# Patient Record
Sex: MALE | URBAN - METROPOLITAN AREA
[De-identification: names, ages, dates, MRNs, and addresses within clinical notes are randomized per-mention and may not be internally consistent; named-entity substitution may affect disease eponyms.]

---

## 2019-02-21 ENCOUNTER — HOSPITAL ENCOUNTER (OUTPATIENT)
Age: 59
Setting detail: SPECIMEN
Discharge: HOME | End: 2019-02-21
Payer: COMMERCIAL

## 2019-02-21 LAB
-: NORMAL
ALBUMIN SERPL-MCNC: 4.5 G/DL (ref 3.5–5.2)
ALBUMIN/GLOBULIN RATIO: 1.7 (ref 1–2.5)
ALP BLD-CCNC: 47 U/L (ref 40–129)
ALT SERPL-CCNC: 15 U/L (ref 5–41)
AMORPHOUS: NORMAL
AMYLASE: 75 U/L (ref 28–100)
ANION GAP SERPL CALCULATED.3IONS-SCNC: 13 MMOL/L (ref 9–17)
AST SERPL-CCNC: 17 U/L
BACTERIA: NORMAL
BILIRUB SERPL-MCNC: 0.7 MG/DL (ref 0.3–1.2)
BILIRUBIN URINE: NEGATIVE
BUN BLDV-MCNC: 11 MG/DL (ref 6–20)
BUN/CREAT BLD: ABNORMAL (ref 9–20)
CALCIUM SERPL-MCNC: 9.7 MG/DL (ref 8.6–10.4)
CASTS UA: NORMAL /LPF (ref 0–8)
CHLORIDE BLD-SCNC: 106 MMOL/L (ref 98–107)
CHOLESTEROL/HDL RATIO: 2.8
CHOLESTEROL: 131 MG/DL
CO2: 25 MMOL/L (ref 20–31)
COLOR: YELLOW
COMMENT UA: ABNORMAL
CREAT SERPL-MCNC: 0.92 MG/DL (ref 0.7–1.2)
CRYSTALS, UA: NORMAL /HPF
EPITHELIAL CELLS UA: NORMAL /HPF (ref 0–5)
ESTIMATED AVERAGE GLUCOSE: 111 MG/DL
GFR AFRICAN AMERICAN: >60 ML/MIN
GFR NON-AFRICAN AMERICAN: >60 ML/MIN
GFR SERPL CREATININE-BSD FRML MDRD: ABNORMAL ML/MIN/{1.73_M2}
GFR SERPL CREATININE-BSD FRML MDRD: ABNORMAL ML/MIN/{1.73_M2}
GLUCOSE BLD-MCNC: 108 MG/DL (ref 70–99)
GLUCOSE URINE: NEGATIVE
HBA1C MFR BLD: 5.5 % (ref 4–6)
HCT VFR BLD CALC: 51.5 % (ref 40.7–50.3)
HDLC SERPL-MCNC: 46 MG/DL
HEMOGLOBIN: 16.4 G/DL (ref 13–17)
KETONES, URINE: ABNORMAL
LDL CHOLESTEROL: 75 MG/DL (ref 0–130)
LEUKOCYTE ESTERASE, URINE: NEGATIVE
LIPASE: 54 U/L (ref 13–60)
MCH RBC QN AUTO: 26.9 PG (ref 25.2–33.5)
MCHC RBC AUTO-ENTMCNC: 31.8 G/DL (ref 28.4–34.8)
MCV RBC AUTO: 84.4 FL (ref 82.6–102.9)
MUCUS: NORMAL
NITRITE, URINE: NEGATIVE
NRBC AUTOMATED: 0 PER 100 WBC
OTHER OBSERVATIONS UA: NORMAL
PDW BLD-RTO: 12.9 % (ref 11.8–14.4)
PH UA: 5.5 (ref 5–8)
PLATELET # BLD: 268 K/UL (ref 138–453)
PMV BLD AUTO: 12.1 FL (ref 8.1–13.5)
POTASSIUM SERPL-SCNC: 4.4 MMOL/L (ref 3.7–5.3)
PROSTATE SPECIFIC ANTIGEN: 0.44 UG/L
PROTEIN UA: NEGATIVE
RBC # BLD: 6.1 M/UL (ref 4.21–5.77)
RBC UA: NORMAL /HPF (ref 0–4)
RENAL EPITHELIAL, UA: NORMAL /HPF
SEDIMENTATION RATE, ERYTHROCYTE: 0 MM (ref 0–10)
SODIUM BLD-SCNC: 144 MMOL/L (ref 135–144)
SPECIFIC GRAVITY UA: 1.02 (ref 1–1.03)
TOTAL PROTEIN: 7.2 G/DL (ref 6.4–8.3)
TRICHOMONAS: NORMAL
TRIGL SERPL-MCNC: 51 MG/DL
TSH SERPL DL<=0.05 MIU/L-ACNC: 1.78 MIU/L (ref 0.3–5)
TURBIDITY: ABNORMAL
URINE HGB: NEGATIVE
UROBILINOGEN, URINE: NORMAL
VLDLC SERPL CALC-MCNC: NORMAL MG/DL (ref 1–30)
WBC # BLD: 5.8 K/UL (ref 3.5–11.3)
WBC UA: NORMAL /HPF (ref 0–5)
YEAST: NORMAL

## 2019-02-22 LAB
CULTURE: NO GROWTH
Lab: NORMAL
SPECIMEN DESCRIPTION: NORMAL

## 2022-10-28 ENCOUNTER — APPOINTMENT (OUTPATIENT)
Dept: CT IMAGING | Age: 62
End: 2022-10-28
Payer: COMMERCIAL

## 2022-10-28 ENCOUNTER — APPOINTMENT (OUTPATIENT)
Dept: GENERAL RADIOLOGY | Age: 62
End: 2022-10-28
Payer: COMMERCIAL

## 2022-10-28 ENCOUNTER — HOSPITAL ENCOUNTER (EMERGENCY)
Age: 62
Discharge: NURSING, REHAB OR LONG-TERM CARE | End: 2022-10-29
Attending: EMERGENCY MEDICINE
Payer: COMMERCIAL
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
## 2022-10-28 DIAGNOSIS — F22 PARANOIA (HCC): ICD-10-CM

## 2022-10-28 DIAGNOSIS — F22 DELUSIONS (HCC): ICD-10-CM
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
## 2022-10-28 LAB
ABO/RH: NORMAL
ANION GAP SERPL CALCULATED.3IONS-SCNC: 22 MMOL/L (ref 9–17)
ANTIBODY SCREEN: NEGATIVE
ARM BAND NUMBER: NORMAL
BLOOD BANK SPECIMEN: ABNORMAL
BUN BLDV-MCNC: 14 MG/DL (ref 8–23)
CARBOXYHEMOGLOBIN: 1.2 % (ref 0–5)
CHLORIDE BLD-SCNC: 96 MMOL/L (ref 98–107)
CO2: 20 MMOL/L (ref 20–31)
CREAT SERPL-MCNC: 1.21 MG/DL (ref 0.7–1.2)
ETHANOL PERCENT: <0.01 %
ETHANOL: <10 MG/DL
EXPIRATION DATE: NORMAL
FIO2: ABNORMAL
GFR SERPL CREATININE-BSD FRML MDRD: 41 ML/MIN/1.73M2
GLUCOSE BLD-MCNC: 185 MG/DL (ref 70–99)
HCG QUALITATIVE: ABNORMAL
HCO3 VENOUS: 19.5 MMOL/L (ref 24–30)
HCT VFR BLD CALC: 51.8 % (ref 40.7–50.3)
HEMOGLOBIN: 17.3 G/DL (ref 13–17)
INR BLD: 1.1
MCH RBC QN AUTO: 27.4 PG (ref 25.2–33.5)
MCHC RBC AUTO-ENTMCNC: 33.4 G/DL (ref 28.4–34.8)
MCV RBC AUTO: 82.1 FL (ref 82.6–102.9)
NEGATIVE BASE EXCESS, VEN: 2.4 MMOL/L (ref 0–2)
NRBC AUTOMATED: 0 PER 100 WBC
O2 SAT, VEN: 92 % (ref 60–85)
PARTIAL THROMBOPLASTIN TIME: 21.5 SEC (ref 20.5–30.5)
PATIENT TEMP: 37
PCO2, VEN: 28.6 MM HG (ref 39–55)
PDW BLD-RTO: 13 % (ref 11.8–14.4)
PH VENOUS: 7.45 (ref 7.32–7.42)
PLATELET # BLD: 215 K/UL (ref 138–453)
PMV BLD AUTO: 12.5 FL (ref 8.1–13.5)
PO2, VEN: 57.2 MM HG (ref 30–50)
POTASSIUM SERPL-SCNC: 3.5 MMOL/L (ref 3.7–5.3)
PROTHROMBIN TIME: 11.2 SEC (ref 9.1–12.3)
RBC # BLD: 6.31 M/UL (ref 4.21–5.77)
SODIUM BLD-SCNC: 138 MMOL/L (ref 135–144)
WBC # BLD: 11.7 K/UL (ref 3.5–11.3)

## 2022-10-28 PROCEDURE — 96374 THER/PROPH/DIAG INJ IV PUSH: CPT

## 2022-10-28 PROCEDURE — 6360000002 HC RX W HCPCS: Performed by: STUDENT IN AN ORGANIZED HEALTH CARE EDUCATION/TRAINING PROGRAM

## 2022-10-28 PROCEDURE — 86900 BLOOD TYPING SEROLOGIC ABO: CPT

## 2022-10-28 PROCEDURE — 72125 CT NECK SPINE W/O DYE: CPT

## 2022-10-28 PROCEDURE — G0480 DRUG TEST DEF 1-7 CLASSES: HCPCS

## 2022-10-28 PROCEDURE — 85730 THROMBOPLASTIN TIME PARTIAL: CPT

## 2022-10-28 PROCEDURE — 6360000004 HC RX CONTRAST MEDICATION

## 2022-10-28 PROCEDURE — 90715 TDAP VACCINE 7 YRS/> IM: CPT

## 2022-10-28 PROCEDURE — 6810039001 HC L1 TRAUMA PRIORITY

## 2022-10-28 PROCEDURE — 3209999900 CT LUMBAR SPINE TRAUMA RECONSTRUCTION

## 2022-10-28 PROCEDURE — 96375 TX/PRO/DX INJ NEW DRUG ADDON: CPT

## 2022-10-28 PROCEDURE — 3209999900 CT THORACIC SPINE TRAUMA RECONSTRUCTION

## 2022-10-28 PROCEDURE — 80307 DRUG TEST PRSMV CHEM ANLYZR: CPT

## 2022-10-28 PROCEDURE — 99285 EMERGENCY DEPT VISIT HI MDM: CPT

## 2022-10-28 PROCEDURE — 80051 ELECTROLYTE PANEL: CPT

## 2022-10-28 PROCEDURE — 74177 CT ABD & PELVIS W/CONTRAST: CPT

## 2022-10-28 PROCEDURE — 90471 IMMUNIZATION ADMIN: CPT

## 2022-10-28 PROCEDURE — 82947 ASSAY GLUCOSE BLOOD QUANT: CPT

## 2022-10-28 PROCEDURE — 6360000002 HC RX W HCPCS

## 2022-10-28 PROCEDURE — 6370000000 HC RX 637 (ALT 250 FOR IP): Performed by: STUDENT IN AN ORGANIZED HEALTH CARE EDUCATION/TRAINING PROGRAM

## 2022-10-28 PROCEDURE — 84703 CHORIONIC GONADOTROPIN ASSAY: CPT

## 2022-10-28 PROCEDURE — 86850 RBC ANTIBODY SCREEN: CPT

## 2022-10-28 PROCEDURE — 2500000003 HC RX 250 WO HCPCS

## 2022-10-28 PROCEDURE — 82805 BLOOD GASES W/O2 SATURATION: CPT

## 2022-10-28 PROCEDURE — 73130 X-RAY EXAM OF HAND: CPT

## 2022-10-28 PROCEDURE — 86901 BLOOD TYPING SEROLOGIC RH(D): CPT

## 2022-10-28 PROCEDURE — 85610 PROTHROMBIN TIME: CPT

## 2022-10-28 PROCEDURE — 84520 ASSAY OF UREA NITROGEN: CPT

## 2022-10-28 PROCEDURE — 80179 DRUG ASSAY SALICYLATE: CPT

## 2022-10-28 PROCEDURE — 70450 CT HEAD/BRAIN W/O DYE: CPT

## 2022-10-28 PROCEDURE — 80143 DRUG ASSAY ACETAMINOPHEN: CPT

## 2022-10-28 PROCEDURE — 73110 X-RAY EXAM OF WRIST: CPT

## 2022-10-28 PROCEDURE — 82565 ASSAY OF CREATININE: CPT

## 2022-10-28 PROCEDURE — 85027 COMPLETE CBC AUTOMATED: CPT

## 2022-10-28 PROCEDURE — 12004 RPR S/N/AX/GEN/TRK7.6-12.5CM: CPT

## 2022-10-28 RX ORDER — ACETAMINOPHEN 500 MG
1000 TABLET ORAL ONCE
Status: COMPLETED | OUTPATIENT
Start: 2022-10-28 | End: 2022-10-28

## 2022-10-28 RX ORDER — LIDOCAINE HYDROCHLORIDE 10 MG/ML
10 INJECTION, SOLUTION INFILTRATION; PERINEURAL ONCE
Status: COMPLETED | OUTPATIENT
Start: 2022-10-28 | End: 2022-10-28

## 2022-10-28 RX ORDER — FENTANYL CITRATE 50 UG/ML
50 INJECTION, SOLUTION INTRAMUSCULAR; INTRAVENOUS ONCE
Status: COMPLETED | OUTPATIENT
Start: 2022-10-28 | End: 2022-10-28

## 2022-10-28 RX ADMIN — IOPAMIDOL 130 ML: 755 INJECTION, SOLUTION INTRAVENOUS at 21:22

## 2022-10-28 RX ADMIN — LIDOCAINE HYDROCHLORIDE 10 ML: 10 INJECTION, SOLUTION INFILTRATION; PERINEURAL at 23:38

## 2022-10-28 RX ADMIN — FENTANYL CITRATE 50 MCG: 50 INJECTION, SOLUTION INTRAMUSCULAR; INTRAVENOUS at 21:41

## 2022-10-28 RX ADMIN — TETANUS TOXOID, REDUCED DIPHTHERIA TOXOID AND ACELLULAR PERTUSSIS VACCINE, ADSORBED 0.5 ML: 5; 2.5; 8; 8; 2.5 SUSPENSION INTRAMUSCULAR at 21:08

## 2022-10-28 RX ADMIN — ACETAMINOPHEN 1000 MG: 500 TABLET ORAL at 22:51

## 2022-10-28 RX ADMIN — Medication 2000 MG: at 21:58

## 2022-10-28 ASSESSMENT — PAIN SCALES - GENERAL
PAINLEVEL_OUTOF10: 5

## 2022-10-28 ASSESSMENT — ENCOUNTER SYMPTOMS
EYES NEGATIVE: 1
GASTROINTESTINAL NEGATIVE: 1
ALLERGIC/IMMUNOLOGIC NEGATIVE: 1
RESPIRATORY NEGATIVE: 1

## 2022-10-28 ASSESSMENT — PAIN DESCRIPTION - ORIENTATION: ORIENTATION: LOWER

## 2022-10-28 ASSESSMENT — LIFESTYLE VARIABLES
HOW MANY STANDARD DRINKS CONTAINING ALCOHOL DO YOU HAVE ON A TYPICAL DAY: PATIENT DOES NOT DRINK
HOW OFTEN DO YOU HAVE A DRINK CONTAINING ALCOHOL: NEVER

## 2022-10-28 ASSESSMENT — PAIN DESCRIPTION - LOCATION: LOCATION: BACK

## 2022-10-29 ENCOUNTER — HOSPITAL ENCOUNTER (INPATIENT)
Age: 62
LOS: 3 days | Discharge: HOME | DRG: 885 | End: 2022-11-01
Attending: PSYCHIATRY & NEUROLOGY | Admitting: PSYCHIATRY & NEUROLOGY
Payer: MEDICARE

## 2022-10-29 VITALS
DIASTOLIC BLOOD PRESSURE: 85 MMHG | BODY MASS INDEX: 29.12 KG/M2 | HEART RATE: 93 BPM | RESPIRATION RATE: 17 BRPM | SYSTOLIC BLOOD PRESSURE: 137 MMHG | OXYGEN SATURATION: 97 % | TEMPERATURE: 98.7 F | WEIGHT: 215 LBS | HEIGHT: 72 IN

## 2022-10-29 PROBLEM — S61.419A LACERATION OF HAND WITHOUT FOREIGN BODY: Status: ACTIVE | Noted: 2022-10-29

## 2022-10-29 PROBLEM — F23 ACUTE PSYCHOSIS (HCC): Status: ACTIVE | Noted: 2022-10-29

## 2022-10-29 LAB
ACETAMINOPHEN LEVEL: <5 UG/ML (ref 10–30)
AMPHETAMINE SCREEN URINE: NEGATIVE
BARBITURATE SCREEN URINE: NEGATIVE
BENZODIAZEPINE SCREEN, URINE: NEGATIVE
BILIRUBIN URINE: NEGATIVE
CANNABINOID SCREEN URINE: POSITIVE
CASTS UA: NORMAL /LPF (ref 0–2)
CASTS UA: NORMAL /LPF (ref 0–2)
COCAINE METABOLITE, URINE: NEGATIVE
COLOR: YELLOW
EPITHELIAL CELLS UA: NORMAL /HPF (ref 0–5)
FENTANYL URINE: POSITIVE
GLUCOSE URINE: NEGATIVE
KETONES, URINE: ABNORMAL
LEUKOCYTE ESTERASE, URINE: NEGATIVE
METHADONE SCREEN, URINE: NEGATIVE
NITRITE, URINE: NEGATIVE
OPIATES, URINE: NEGATIVE
OXYCODONE SCREEN URINE: NEGATIVE
PH UA: 5.5 (ref 5–8)
PHENCYCLIDINE, URINE: NEGATIVE
PROTEIN UA: ABNORMAL
RBC UA: NORMAL /HPF (ref 0–2)
SALICYLATE LEVEL: <1 MG/DL (ref 3–10)
SPECIFIC GRAVITY UA: 1.09 (ref 1–1.03)
TEST INFORMATION: ABNORMAL
TOXIC TRICYCLIC SC,BLOOD: NEGATIVE
TURBIDITY: CLEAR
URINE HGB: NEGATIVE
UROBILINOGEN, URINE: NORMAL
WBC UA: NORMAL /HPF (ref 0–5)

## 2022-10-29 PROCEDURE — 1240000000 HC EMOTIONAL WELLNESS R&B

## 2022-10-29 PROCEDURE — 81001 URINALYSIS AUTO W/SCOPE: CPT

## 2022-10-29 PROCEDURE — 6370000000 HC RX 637 (ALT 250 FOR IP): Performed by: PSYCHIATRY & NEUROLOGY

## 2022-10-29 PROCEDURE — 99223 1ST HOSP IP/OBS HIGH 75: CPT | Performed by: PSYCHIATRY & NEUROLOGY

## 2022-10-29 PROCEDURE — APPSS60 APP SPLIT SHARED TIME 46-60 MINUTES

## 2022-10-29 PROCEDURE — 99222 1ST HOSP IP/OBS MODERATE 55: CPT | Performed by: INTERNAL MEDICINE

## 2022-10-29 PROCEDURE — 80307 DRUG TEST PRSMV CHEM ANLYZR: CPT

## 2022-10-29 RX ORDER — HALOPERIDOL 5 MG/ML
5 INJECTION INTRAMUSCULAR EVERY 4 HOURS PRN
Status: DISCONTINUED | OUTPATIENT
Start: 2022-10-29 | End: 2022-11-01 | Stop reason: HOSPADM

## 2022-10-29 RX ORDER — HALOPERIDOL 5 MG/1
5 TABLET ORAL EVERY 4 HOURS PRN
Status: DISCONTINUED | OUTPATIENT
Start: 2022-10-29 | End: 2022-11-01 | Stop reason: HOSPADM

## 2022-10-29 RX ORDER — IBUPROFEN 400 MG/1
400 TABLET ORAL EVERY 6 HOURS PRN
Status: DISCONTINUED | OUTPATIENT
Start: 2022-10-29 | End: 2022-11-01 | Stop reason: HOSPADM

## 2022-10-29 RX ORDER — POLYETHYLENE GLYCOL 3350 17 G/17G
17 POWDER, FOR SOLUTION ORAL DAILY PRN
Status: DISCONTINUED | OUTPATIENT
Start: 2022-10-29 | End: 2022-11-01 | Stop reason: HOSPADM

## 2022-10-29 RX ORDER — LORAZEPAM 2 MG/ML
2 INJECTION INTRAMUSCULAR EVERY 4 HOURS PRN
Status: DISCONTINUED | OUTPATIENT
Start: 2022-10-29 | End: 2022-11-01 | Stop reason: HOSPADM

## 2022-10-29 RX ORDER — PALIPERIDONE 3 MG/1
3 TABLET, EXTENDED RELEASE ORAL DAILY
Status: DISCONTINUED | OUTPATIENT
Start: 2022-10-29 | End: 2022-11-01 | Stop reason: HOSPADM

## 2022-10-29 RX ORDER — TRAZODONE HYDROCHLORIDE 50 MG/1
50 TABLET ORAL NIGHTLY PRN
Status: DISCONTINUED | OUTPATIENT
Start: 2022-10-29 | End: 2022-11-01 | Stop reason: HOSPADM

## 2022-10-29 RX ORDER — HYDROXYZINE 50 MG/1
50 TABLET, FILM COATED ORAL 3 TIMES DAILY PRN
Status: DISCONTINUED | OUTPATIENT
Start: 2022-10-29 | End: 2022-11-01 | Stop reason: HOSPADM

## 2022-10-29 RX ORDER — ACETAMINOPHEN 325 MG/1
650 TABLET ORAL EVERY 4 HOURS PRN
Status: DISCONTINUED | OUTPATIENT
Start: 2022-10-29 | End: 2022-11-01 | Stop reason: HOSPADM

## 2022-10-29 RX ORDER — LORAZEPAM 1 MG/1
2 TABLET ORAL EVERY 4 HOURS PRN
Status: DISCONTINUED | OUTPATIENT
Start: 2022-10-29 | End: 2022-11-01 | Stop reason: HOSPADM

## 2022-10-29 RX ORDER — DIPHENHYDRAMINE HYDROCHLORIDE 50 MG/ML
50 INJECTION INTRAMUSCULAR; INTRAVENOUS EVERY 4 HOURS PRN
Status: DISCONTINUED | OUTPATIENT
Start: 2022-10-29 | End: 2022-11-01 | Stop reason: HOSPADM

## 2022-10-29 RX ORDER — MAGNESIUM HYDROXIDE/ALUMINUM HYDROXICE/SIMETHICONE 120; 1200; 1200 MG/30ML; MG/30ML; MG/30ML
30 SUSPENSION ORAL EVERY 6 HOURS PRN
Status: DISCONTINUED | OUTPATIENT
Start: 2022-10-29 | End: 2022-11-01 | Stop reason: HOSPADM

## 2022-10-29 RX ADMIN — PALIPERIDONE 3 MG: 3 TABLET, EXTENDED RELEASE ORAL at 18:09

## 2022-10-29 RX ADMIN — ACETAMINOPHEN 650 MG: 325 TABLET, FILM COATED ORAL at 18:09

## 2022-10-29 ASSESSMENT — SLEEP AND FATIGUE QUESTIONNAIRES
DO YOU HAVE DIFFICULTY SLEEPING: NO
DO YOU USE A SLEEP AID: NO
AVERAGE NUMBER OF SLEEP HOURS: 8

## 2022-10-29 ASSESSMENT — ENCOUNTER SYMPTOMS
VOMITING: 0
FACIAL SWELLING: 0
ABDOMINAL PAIN: 0
DIARRHEA: 0
NAUSEA: 0
BACK PAIN: 0
SHORTNESS OF BREATH: 0

## 2022-10-29 ASSESSMENT — PAIN DESCRIPTION - DESCRIPTORS: DESCRIPTORS: ACHING;DISCOMFORT

## 2022-10-29 ASSESSMENT — PAIN - FUNCTIONAL ASSESSMENT: PAIN_FUNCTIONAL_ASSESSMENT: ACTIVITIES ARE NOT PREVENTED

## 2022-10-29 ASSESSMENT — PAIN DESCRIPTION - LOCATION: LOCATION: BACK

## 2022-10-29 ASSESSMENT — PAIN SCALES - WONG BAKER: WONGBAKER_NUMERICALRESPONSE: 2

## 2022-10-29 ASSESSMENT — PAIN DESCRIPTION - ORIENTATION: ORIENTATION: RIGHT

## 2022-10-29 ASSESSMENT — PAIN SCALES - GENERAL
PAINLEVEL_OUTOF10: 2
PAINLEVEL_OUTOF10: 5
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
## 2022-10-29 NOTE — ED NOTES
JEFF attempted to present case to [facility]. [facility] unable to connect with [facility]. JEFF waiting on callback.      [facility], [facility] [place]  10/29/22 [address] [place] [address], [facility] [place]  10/29/22 [id number]
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
## 2022-10-29 NOTE — BH NOTE
Community Meeting Group Note        Date: 10/29/22 Start Time:  0915   End Time:  7255      Number of Participants in Group & Unit Census:  0/14    Topic: Goal Group    Goal of Group: To identify a goal for the day. Comments:     Patient did not participate in [employer] group, despite staff encouragement and explanation of benefits. Patient remain seclusive to self. Q15 minute safety checks maintained for patient safety and will continue to encourage patient to attend unit programming.

## 2022-10-29 NOTE — ED NOTES
The following labs were labeled with patient stickers & tubed to lab;    []Lavender   []On Ice  []Blue  []Green/ Yellow  []Green/ Black []On Ice  []Pink  []Red  []Yellow    []COVID-19 Swab []Rapid  []COVID-19 Swab []PCR    [x]Urine Sample  []Pelvic Cultures    []Blood Cultures       Alina Kirkpatrick RN  10/29/22 0144

## 2022-10-29 NOTE — PLAN OF CARE
[facility]  Initial Interdisciplinary Treatment Plan NOTE    Review Date & Time: 10/29/22 1325    Patient was not in treatment team    Admission Type:   Admission Type:  Involuntary    Reason for admission:  Reason for Admission: delusional, bizarre behavior      Estimated Length of Stay Update:    Estimated Discharge Date Update:     EDUCATION:   Learner Progress Toward Treatment Goals: Reviewed goals and plan of care    Method: Verbal    Outcome: No evidence of Learning/Unable to attend    PATIENT GOALS:     PLAN/TREATMENT RECOMMENDATIONS UPDATE:    GOALS UPDATE:   Time frame for Short-Term Goals:     Afshin Adams RN
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
## 2022-10-29 NOTE — PLAN OF CARE
Problem: Psychosis  Goal: Will report no hallucinations or delusions  Description: INTERVENTIONS:  1. Administer medication as  ordered  2. Assist with reality testing to support increasing orientation  3. Assess if patient's hallucinations or delusions are encouraging self harm or harm to others and intervene as appropriate  10/29/2022 1732 by Errol Luna LPN  Outcome: Progressing     Problem: Self Harm/Suicidality  Goal: Will have no self-injury during hospital stay  Description: INTERVENTIONS:  1. Q 30 MINUTES: Routine safety checks  2. Q SHIFT & PRN: Assess risk to determine if routine checks are adequate to maintain patient safety  10/29/2022 1732 by Errol Luna LPN  Outcome: Adequate for Discharge   Mr. Winters  is seen in the dayroom, paranoid about germs, education and reassurance given. Focused on cleaning, redirection given to patient to focus on taking care of self. Denies any thougths at this time to harm self, tangential thoughts. Was able to sleep this afternoon. Took medication that was started. Reports anxiety and depression, Remained in control. Problem: Involuntary Admit  Goal: Will cooperate with staff recommendations and doctor's orders and will demonstrate appropriate behavior  Description: INTERVENTIONS:  1. Treat underlying conditions and offer medication as ordered  2. Educate regarding involuntary admission procedures and rules  3.  Contain excessive/inappropriate behavior per unit and hospital policies  14/69/0209 [id number] by Errol Luna LPN  Outcome: Adequate for Discharge
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
## 2022-10-29 NOTE — ED NOTES
Reviewed patient case with on call psychiatrist who finds that patient meets criteria for inpatient psychiatric admission. Pt to be admitted to the [facility] by Doctor Mariel with a diagnosis of acute psychosis. Pt admitted involuntary and assigned room # is 220. ETA for [employer]  is 0500. All necessary forms to be faxed.        RN to RN report # is [id number], [facility]  10/29/22 [address] [place] [address],[address], [facility]  10/29/22 2485
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
## 2022-10-29 NOTE — PROGRESS NOTES
Behavioral Services  Medicare Certification Upon Admission    I certify that this patient's inpatient psychiatric hospital admission is medically necessary for:    [x] (1) Treatment which could reasonably be expected to improve this patient's condition,       [x] (2) Or for diagnostic study;     AND     [x](2) The inpatient psychiatric services are provided while the individual is under the care of a physician and are included in the individualized plan of care.     Estimated length of stay/service 2-9 days    Plan for post-hospital care -outpatient care    Electronically signed by Reg Pacheco MD on 10/29/2022 at 5:29 PM

## 2022-10-29 NOTE — PROGRESS NOTES
[facility] CARE DEPARTMENT - [facility]     Emergency/Trauma Note    PATIENT NAME: Cj Trauma Xxpulaski    Shift date: 10/28/22  Shift day: Friday   Shift # 2    Room # 14/14   Name: Deni Rees            Age: 65 y.o. Gender: male          Jain: Unknown   Place of Religious:     Trauma/Incident type: Adult Trauma Priority  Admit Date & Time: 10/28/2022  8:53 PM  TRAUMA NAME: mahad    ADVANCE DIRECTIVES IN CHART? No    NAME OF DECISION MAKER: N/A    RELATIONSHIP OF DECISION MAKER TO PATIENT: N/A    PATIENT/EVENT DESCRIPTION:  Cj Trauma Dionte December is a 65 y.o. male who arrived via [facility]AL [facility]IC[facility] AT [facility] EMS from scene as a trauma priority. Pt to be admitted to 14/14. SPIRITUAL ASSESSMENT-INTERVENTION-OUTCOME:  Nia Lima was a ministry of presence to medical staff and patient upon arrival.  met with EMS who stated family was in route to hospital.  gathered patient information from drivers licence and communicated with appropriate departments.  was informed by triage of family arrival.  met with son Oneida and son-in-law Regino Wilson.  informed medical staff of family arrival. RN spoke with family regarding meeting members of multidisciplinary team before connecting patient with family. Patient's family did not appear to mind  presence. Family appeared anxious and coping. PATIENT BELONGINGS:   writing did not handle patient belongings    ANY BELONGINGS OF SIGNIFICANT VALUE NOTED:  N/A    REGISTRATION STAFF NOTIFIED? Yes      WHAT IS YOUR SPIRITUAL CARE PLAN FOR THIS PATIENT?:   Chaplains will remain available to offer spiritual and emotional support as needed.     Electronically signed by Wai Langston on 10/28/2022 at 10:29 PM.  [facility]  [phone]

## 2022-10-29 NOTE — ED PROVIDER NOTES
Faculty Sign-Out Attestation  Handoff taken on the following patient from prior Attending Physician: Camille Adam    I was available and discussed any additional care issues that arose and coordinated the management plans with the resident(s) caring for the patient during my duty period. Any areas of disagreement with residents documentation of care or procedures are noted on the chart. I was personally present for the key portions of any/all procedures during my duty period. I have documented in the chart those procedures where I was not present during the key portions.     Laceration wrist, suicidal, cleared by trauma, // pink slip  > going to [facility], DO  Attending Physician      Arlene Driscoll, DO  10/29/22 0201    > transferring to [facility],   10/29/22 9882
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
## 2022-10-29 NOTE — PROCEDURES
PROCEDURE NOTE - LACERATION CLOSURE    PATIENT NAME: [address] [place]  MEDICAL RECORD NO. [id number]  DATE: 10/29/2022  SURGEON: Dr. Erwin Aly / José Goldman MD  PRIMARY CARE PHYSICIAN: No primary care provider on file. PREOPERATIVE DIAGNOSIS: Laceration(s) as follows:   LOCATION: Left Wrist   LENGTH: 5 cm (ventral), 3 cm (dorsal), 2 cm (dorsal)   LAYERED CLOSURE: No    POSTOPERATIVE DIAGNOSIS:  Same  PROCEDURE PERFORMED:  Suture closure of laceration  ANESTHESIA:  Local utilizing  Lidocaine 1% without epinephrine  ESTIMATED BLOOD LOSS:  Less than 25 ml. COMPLICATIONS:  None immediately appreciated. OPERATIVE NOTE PREPARED BY: José Goldman MD     DISCUSSION:  [address] [place] [address] Torie Harrell is a 65y.o.-year-old male. The history and physical examination were reviewed and confirmed. The diagnoses, proposed procedure, risks, possible complications, benefits and alternatives were discussed with the patient or family. He was given the opportunity to ask questions, and once answered, informed consent was obtained. The patient was then prepared for the procedure. PROCEDURE:  A timeout was initiated and the procedure and patient were confirmed by those present. The wound area was irrigated with sterile saline, cleansed with povidone iodine and draped in a sterile fashion. The wound area was anesthetized with Lidocaine 1% without epinephrine without added sodium bicarbonate. The wound was repaired with 2-0 Nylon; 1 sutures were used. The wound was dressed and antibiotic ointment was applied. No immediate complication was evident. All sponge, instrument and needle counts were correct at the completion of the procedure. Plan -  Keep the area clean. Apply bacitracin ointment daily  Suture to be removed after 10- 14 days in the clinic  Follow up with Trauma clinic for further evaluation and management of the wound.       José Goldman MD  10/29/22, 1:08 AM

## 2022-10-30 PROCEDURE — 1240000000 HC EMOTIONAL WELLNESS R&B

## 2022-10-30 PROCEDURE — APPSS30 APP SPLIT SHARED TIME 16-30 MINUTES

## 2022-10-30 PROCEDURE — 6370000000 HC RX 637 (ALT 250 FOR IP): Performed by: PSYCHIATRY & NEUROLOGY

## 2022-10-30 PROCEDURE — 99232 SBSQ HOSP IP/OBS MODERATE 35: CPT | Performed by: PSYCHIATRY & NEUROLOGY

## 2022-10-30 RX ADMIN — ACETAMINOPHEN 650 MG: 325 TABLET, FILM COATED ORAL at 09:17

## 2022-10-30 RX ADMIN — ACETAMINOPHEN 650 MG: 325 TABLET, FILM COATED ORAL at 21:17

## 2022-10-30 RX ADMIN — PALIPERIDONE 3 MG: 3 TABLET, EXTENDED RELEASE ORAL at 09:17

## 2022-10-30 ASSESSMENT — PAIN SCALES - GENERAL
PAINLEVEL_OUTOF10: 2
PAINLEVEL_OUTOF10: 4
PAINLEVEL_OUTOF10: 4

## 2022-10-30 ASSESSMENT — PAIN DESCRIPTION - ORIENTATION: ORIENTATION: LOWER

## 2022-10-30 ASSESSMENT — PAIN DESCRIPTION - DESCRIPTORS: DESCRIPTORS: ACHING

## 2022-10-30 ASSESSMENT — PAIN DESCRIPTION - LOCATION
LOCATION: BACK
LOCATION: BACK

## 2022-10-30 ASSESSMENT — PAIN SCALES - WONG BAKER: WONGBAKER_NUMERICALRESPONSE: 2

## 2022-10-30 ASSESSMENT — PAIN - FUNCTIONAL ASSESSMENT: PAIN_FUNCTIONAL_ASSESSMENT: ACTIVITIES ARE NOT PREVENTED

## 2022-10-30 NOTE — PLAN OF CARE
Problem: Psychosis  Goal: Will report no hallucinations or delusions  Description: INTERVENTIONS:  1. Administer medication as  ordered  2. Assist with reality testing to support increasing orientation  3. Assess if patient's hallucinations or delusions are encouraging self harm or harm to others and intervene as appropriate  10/30/2022 0846 by Tiffanie Gates  Outcome: Progressing  Note: Patient denies experiencing any feelings of depression, any thoughts of wanting to harm themself or others, as well as any hallucinations. Patient relates that he is experiencing mild feelings of anxiety. Patient is able to perform ADL's independently. Patient states that they are eating and sleeping sufficiently. Patient has been out in dayroom, but remains aloof to his peers. Patient has not been attending group sessions, but staff will continue to encourage involvement. Patient remains safe at this time and agrees to notify staff if any thoughts, feelings, or concerns need to be brought to their attention. Q15 minute safety checks maintained.

## 2022-10-30 NOTE — PLAN OF CARE
Problem: Psychosis  Goal: Will report no hallucinations or delusions  Description: INTERVENTIONS:  1. Administer medication as  ordered  2. Assist with reality testing to support increasing orientation  3. Assess if patient's hallucinations or delusions are encouraging self harm or harm to others and intervene as appropriate  10/29/2022 2051 by Vidhi Rogers RN  Outcome: Progressing     Patient remains bizarre, illogical with flight of ideas and paranoid delusions. Patient is cooperative and in behavorial control. Patient remains free from self harm and injury. A safe environment and Q 15 min checks maintained. Patient attended group this evening. Patient encouraged to continue attending unit programming. Yolette Piña will continue to monitor the patient's physical and mental status.

## 2022-10-30 NOTE — GROUP NOTE
Group Therapy Note    Date: 10/30/2022    Group Start Time: 2837  Group End Time: 1110  Group Topic: Music Therapy    NANCY Lyon    Music Therapy Group Note        Date: 10/30/2022   Start Time: 6527  End Time: 1110      Number of Participants in Group & Unit Census:  2/17    Topic: Music appreciation group; Sharing and talking about music     Goal of Group:Goals to demonstrate positive use of time; Increase socialization; Increase self-expression; Normalization of the environment      Comments:     Patient did not participate in Music Therapy group, despite staff encouragement and explanation of benefits. Patient remain seclusive to self. Q15 minute safety checks maintained for patient safety and will continue to encourage patient to attend unit programming.

## 2022-10-30 NOTE — GROUP NOTE
Group Therapy Note    Date: 10/29/2022    Group Start Time:   Group End Time:   Group Topic: Wrap-Up    [facility]    Axel Grande RN        Group Therapy Note    Attendees: 5/15         Patient's Goal:  ***    Notes:  ***    Status After Intervention:  {Status After Intervention:961932512}    Participation Level: {Participation Level:234465102}    Participation Quality: {[facility] PARTICIPATION QUALITY:948900598}      Speech:  {ED  CD_SPEECH:62304}      Thought Process/Content: {Thought Process/Content:573276990}      Affective Functioning: {Affective Functionin}      Mood: {Mood:213451624}      Level of consciousness:  {Level of consciousness:939580799}      Response to [facility]in [facility] Responses to Learnin}      Endings: {[facility] Endings:61903}    Modes of Intervention: {[facility] Modes of Intervention:784794567}      Discipline Responsible: Darrel ROWAN Multidisciplinary:545031627}      Signature:  Axel Grande RN

## 2022-10-30 NOTE — GROUP NOTE
Group Therapy Note    Date: 10/29/2022    Group Start Time: 2030  Group End Time: 2050  Group Topic: Wrap-Up    [facility]    Courtney Gibbons RN        Group Therapy Note    Attendees: 5/15         Status After Intervention:  Improved    Participation Level:  Active Listener    Participation Quality: Appropriate      Speech:  normal      Thought Process/Content: Logical      Affective Functioning: Congruent      Level of consciousness:  Alert      Response to Learning: Able to verbalize current knowledge/experience      Endings: None Reported    Modes of Intervention: Education      Discipline Responsible: Behavorial Wetpaint Tech      Signature:  Courtney Gibbons RN

## 2022-10-30 NOTE — GROUP NOTE
Group Therapy Note    Date: 10/30/2022    Group Start Time: 0900  Group End Time: 0915  Group Topic: Group Documentation    [facility]    Saray Gallego        Group Therapy Note    Attendees: 5/18       Patient's Goal:  stay on schedule and stay tidy    Notes:  morning goal group session    Status After Intervention:  Improved    Participation Level:  Active Listener and Interactive    Participation Quality: Appropriate, Attentive, and Sharing      Speech:  normal      Thought Process/Content: Logical      Affective Functioning: Congruent      Mood: euthymic      Level of consciousness:  Alert, Oriented x4, and Attentive      Response to Learning: Capable of insight, Able to change behavior, and Progressing to goal      Endings: None Reported    Modes of Intervention: Support and Socialization      Discipline Responsible: Behavorial Health Tech      Signature:  Mell Cantrell

## 2022-10-30 NOTE — PROGRESS NOTES
Daily Progress Note  10/30/2022    Patient Name: [facility]: Acute psychosis with suicide attempt         SUBJECTIVE:      Patient is seen today for a follow up assessment. Patient has been compliant with scheduled medications at this time and has not required emergency medications in the past 24 hours. When approached interview patient states suicidal ideations and depression are improving today. Patient reports attempting to cope with \"right from wrong\" and states this is enough to prevent him from ever harming himself again. Patient presents with poor insight into potential for future stressors and is unable to communicate coping with stress in general.  Patient does report paranoia is improving today however states he would be worried that people are following him if he is not in the hospital.  Patient states Dana Feng are not following me in here like they do out there\". Patient is denying medication side effects. Writer encouraged patient to attend groups on the unit. At this time, the patient is not appropriate for a lower level of care. There is risk of decompensation and patient warrants further hospitalization for safety and stabilization. Appetite:  [x] Adequate/Unchanged  [] Increased  [] Decreased      Sleep:       [x] Adequate/Unchanged  [] Fair  [] Poor      Group Attendance on Unit:   [] Yes   [x] Selectively    [] No    Medication Side Effects: Denies         Mental Status Exam  Level of consciousness: Alert and awake. Appearance: Appropriate attire for setting, seated on bed, with fair  grooming and hygiene. Behavior/Motor: Approachable, compliant with interview  Attitude toward examiner: Cooperative, attentive, good eye contact. Speech: normal rate and normal volume   Mood:  Patient reports \" good\". Affect: Incongruent, paranoid  Thought processes: Linear and coherent. Thought content: Denies homicidal ideation.   Suicidal Ideation: Reports improvement in suicidal ideations, without current plan, denies intent to harm self on unit. Unable to contract for safety off unit. Delusions: Patient presents with delusions. Reports improvement in paranoia. Perceptual Disturbance: Patient does not appear to be responding to internal stimuli. Denies auditory hallucinations. Denies visual hallucinations. Cognition: Oriented to person, place, time and situation. Memory: Intact. Insight: Poor. Judgement: Poor. Data   height is 6' (1.829 m) and weight is 206 lb (93.4 kg). His temporal temperature is 97.2 °F (36.2 °C). His blood pressure is 133/79 and his pulse is 121 (abnormal). His respiration is 14. Labs:   Admission on 10/28/2022, Discharged on 10/29/2022   Component Date Value Ref Range Status    Ethanol 10/28/2022 <10  <10 mg/dL Final    Ethanol percent 10/28/2022 <0.010  <0.010 % Final    Blood Bank Specimen 10/28/2022 BILL FOR SERVICES PERFORMED   Final    BUN 10/28/2022 14  8 - 23 mg/dL Final    WBC 10/28/2022 11.7 (A)  3.5 - 11.3 k/uL Final    RBC 10/28/2022 6.31 (A)  4.21 - 5.77 m/uL Final    Hemoglobin 10/28/2022 17.3 (A)  13.0 - 17.0 g/dL Final    Hematocrit 10/28/2022 51.8 (A)  40.7 - 50.3 % Final    MCV 10/28/2022 82.1 (A)  82.6 - 102.9 fL Final    MCH 10/28/2022 27.4  25.2 - 33.5 pg Final    MCHC 10/28/2022 33.4  28.4 - 34.8 g/dL Final    RDW 10/28/2022 13.0  11.8 - 14.4 % Final    Platelets 17/83/7397 215  138 - 453 k/uL Final    MPV 10/28/2022 12.5  8.1 - 13.5 fL Final    NRBC Automated 10/28/2022 0.0  0.0 per 100 WBC Final    Creatinine 10/28/2022 1.21 (A)  0.70 - 1.20 mg/dL Final    Est, Glom Filt Rate 10/28/2022 41 (A)  >60 mL/min/1.73m2 Final    Comment:       Effective Oct 3, 2022        These results are not intended for use in patients <25years of age. eGFR results are calculated without a race factor using the 2021 CKD-EPI equation.   Careful clinical correlation is recommended, particularly when comparing to results   calculated using previous equations. The CKD-EPI equation is less accurate in patients with extremes of muscle mass, extra-renal   metabolism of creatine, excessive creatine ingestion, or following therapy that affects   renal tubular secretion. Glucose 10/28/2022 185 (A)  70 - 99 mg/dL Final    hCG Qual 10/28/2022 MALE   Final    Sodium 10/28/2022 138  135 - 144 mmol/L Final    Potassium 10/28/2022 3.5 (A)  3.7 - 5.3 mmol/L Final    Chloride 10/28/2022 96 (A)  98 - 107 mmol/L Final    CO2 10/28/2022 20  20 - 31 mmol/L Final    Anion Gap 10/28/2022 22 (A)  9 - 17 mmol/L Final    Protime 10/28/2022 11.2  9.1 - 12.3 sec Final    INR 10/28/2022 1.1   Final    Comment:       Therapeutic Range:    Moderate Anticoagulant Intensity:     INR = 2.0-3.0   High Anticoagulant Intensity:     INR = 2.5-3.5            PTT 10/28/2022 21.5  20.5 - 30.5 sec Final    Comment:       IV Heparin Therapy Range:  48.6-77.8      pH, Andres 10/28/2022 7.449 (A)  7.320 - 7.420 Final    pCO2, Andres 10/28/2022 28.6 (A)  39 - 55 mm Hg Final    pO2, Andres 10/28/2022 57.2 (A)  30 - 50 mm Hg Final    HCO3, Venous 10/28/2022 19.5 (A)  24 - 30 mmol/L Final    Negative Base Excess, Andres 10/28/2022 2.4 (A)  0.0 - 2.0 mmol/L Final    O2 Sat, Andres 10/28/2022 92.0 (A)  60.0 - 85.0 % Final    Carboxyhemoglobin 10/28/2022 1.2  0 - 5 % Final    Comment:       Reference Range:  Non-Smokers     0-2%  Average Smoker  2-4%  Heavy Smoker    <10%            Pt Temp 10/28/2022 37.0   Final    FIO2 10/28/2022 INFORMATION NOT PROVIDED   Final    Expiration Date 10/28/2022 10/31/2022,2359   Final    Arm Band Number 10/28/2022 BE [id number]   Final    ABO/Rh 10/28/2022 A NEGATIVE   Final    Antibody Screen 10/28/2022 NEGATIVE   Final    Amphetamine Screen, Ur 10/29/2022 NEGATIVE  NEGATIVE Final    Comment:       (Positive cutoff 1000 ng/mL)                  Barbiturate Screen, Ur 10/29/2022 NEGATIVE  NEGATIVE Final    Comment:       (Positive cutoff 200 ng/mL)                  Benzodiazepine Screen, Urine 10/29/2022 NEGATIVE  NEGATIVE Final    Comment:       (Positive cutoff 200 ng/mL)                  Cocaine Metabolite, Urine 10/29/2022 NEGATIVE  NEGATIVE Final    Comment:       (Positive cutoff 300 ng/mL)                  Methadone Screen, Urine 10/29/2022 NEGATIVE  NEGATIVE Final    Comment:       (Positive cutoff 300 ng/mL)                  Opiates, Urine 10/29/2022 NEGATIVE  NEGATIVE Final    Comment:       (Positive cutoff 300 ng/mL)                  Phencyclidine, Urine 10/29/2022 NEGATIVE  NEGATIVE Final    Comment:       (Positive cutoff 25 ng/mL)                  Cannabinoid Scrn, Ur 10/29/2022 POSITIVE (A)  NEGATIVE Final    Comment:       (Positive cutoff 50 ng/mL)                  Oxycodone Screen, Ur 10/29/2022 NEGATIVE  NEGATIVE Final    Comment:       (Positive cutoff 100 ng/mL)                  Fentanyl, Ur 10/29/2022 POSITIVE (A)  NEGATIVE Final    Comment:       (Positive cutoff  5 ng/ml)            Test Information 10/29/2022 Assay provides medical screening only. The absence of expected drug(s) and/or metabolite(s) may indicate diluted or adulterated urine, limitations of testing or timing of collection. Final    Comment: Testing for legal purposes should be confirmed by another method. To request confirmation   of test result, please call the lab within 7 days of sample submission.       Color, UA 10/29/2022 Yellow  Yellow Final    Turbidity UA 10/29/2022 Clear  Clear Final    Glucose, Ur 10/29/2022 NEGATIVE  NEGATIVE Final    Bilirubin Urine 10/29/2022 NEGATIVE  NEGATIVE Final    Ketones, Urine 10/29/2022 SMALL (A)  NEGATIVE Final    Specific Gravity, UA 10/29/2022 1.092 (A)  1.005 - 1.030 Final    Urine Hgb 10/29/2022 NEGATIVE  NEGATIVE Final    pH, UA 10/29/2022 5.5  5.0 - 8.0 Final    Protein, UA 10/29/2022 1+ (A)  NEGATIVE Final    Urobilinogen, Urine 10/29/2022 Normal  Normal Final    Nitrite, Urine 10/29/2022 NEGATIVE  NEGATIVE Final    Leukocyte Esterase, Urine 10/29/2022 NEGATIVE  NEGATIVE Final    Acetaminophen Level 10/28/2022 <5 (A)  10 - 30 ug/mL Final    Salicylate Lvl 84/71/6507 <1 (A)  3 - 10 mg/dL Final    Toxic Tricyclic Sc,Blood 29/01/9192 NEGATIVE  NEGATIVE Final    WBC, UA 10/29/2022 2 TO 5  0 - 5 /HPF Final    RBC, UA 10/29/2022 0 TO 2  0 - 2 /HPF Final    Casts UA 10/29/2022 10 TO 20  0 - 2 /LPF Final    Casts UA 10/29/2022 HYALINE  0 - 2 /LPF Final    Epithelial Cells UA 10/29/2022 0 TO 2  0 - 5 /HPF Final         Reviewed patient's current plan of care and vital signs with nursing staff. Labs reviewed: [x] Yes    Medications  Current Facility-Administered Medications: acetaminophen (TYLENOL) tablet 650 mg, 650 mg, Oral, Q4H PRN  aluminum & magnesium hydroxide-simethicone (MAALOX) 200-200-20 MG/5ML suspension 30 mL, 30 mL, Oral, Q6H PRN  hydrOXYzine HCl (ATARAX) tablet 50 mg, 50 mg, Oral, TID PRN  ibuprofen (ADVIL;MOTRIN) tablet 400 mg, 400 mg, Oral, Q6H PRN  nicotine polacrilex (NICORETTE) gum 2 mg, 2 mg, Oral, PRN  polyethylene glycol (GLYCOLAX) packet 17 g, 17 g, Oral, Daily PRN  traZODone (DESYREL) tablet 50 mg, 50 mg, Oral, Nightly PRN  haloperidol lactate (HALDOL) injection 5 mg, 5 mg, IntraMUSCular, Q4H PRN **AND** LORazepam (ATIVAN) injection 2 mg, 2 mg, IntraMUSCular, Q4H PRN **AND** diphenhydrAMINE (BENADRYL) injection 50 mg, 50 mg, IntraMUSCular, Q4H PRN  haloperidol (HALDOL) tablet 5 mg, 5 mg, Oral, Q4H PRN **AND** LORazepam (ATIVAN) tablet 2 mg, 2 mg, Oral, Q4H PRN  paliperidone (INVEGA) extended release tablet 3 mg, 3 mg, Oral, Daily    ASSESSMENT  Acute psychosis ([facility] [address]         HANDOFF  Patient symptoms: Showing modest improvement  Consultation with attending physician for medication  Encourage participation in groups and milieu. Probable discharge is to be determined by MD    Electronically signed by DAWIT Giles CNP on 10/30/2022 at 3:24 PM    **This report has been created using voice recognition software.  It may contain minor errors which are inherent in voice recognition technology. **  I independently saw and evaluated the patient. I reviewed the  documentation above. Any additional comments or changes to the    documentation are stated below otherwise agree with assessment. The patient reports finding his medication very helpful while also maintaining that he does not have a mental health issue or the need to be on medications. He continues to have some loose associations. He appears less paranoid. PLAN  Medications as noted above  Attempt to develop insight  Expected Length of Stay is 3-5 days. Psycho-education conducted. Supportive Therapy conducted.   Follow-up daily while on inpatient unit    Electronically signed by Josefina Fernando MD on 10/30/22 at 4:29 PM EDT

## 2022-10-31 PROCEDURE — 6370000000 HC RX 637 (ALT 250 FOR IP): Performed by: PSYCHIATRY & NEUROLOGY

## 2022-10-31 PROCEDURE — 1240000000 HC EMOTIONAL WELLNESS R&B

## 2022-10-31 PROCEDURE — APPSS30 APP SPLIT SHARED TIME 16-30 MINUTES

## 2022-10-31 RX ADMIN — TRAZODONE HYDROCHLORIDE 50 MG: 50 TABLET ORAL at 20:39

## 2022-10-31 RX ADMIN — ACETAMINOPHEN 650 MG: 325 TABLET, FILM COATED ORAL at 09:11

## 2022-10-31 RX ADMIN — HYDROXYZINE HYDROCHLORIDE 50 MG: 50 TABLET, FILM COATED ORAL at 20:39

## 2022-10-31 RX ADMIN — PALIPERIDONE 3 MG: 3 TABLET, EXTENDED RELEASE ORAL at 09:10

## 2022-10-31 ASSESSMENT — PAIN SCALES - GENERAL
PAINLEVEL_OUTOF10: 3
PAINLEVEL_OUTOF10: 2
PAINLEVEL_OUTOF10: 3

## 2022-10-31 ASSESSMENT — PAIN DESCRIPTION - ORIENTATION
ORIENTATION: LOWER
ORIENTATION: LOWER

## 2022-10-31 ASSESSMENT — PAIN DESCRIPTION - LOCATION
LOCATION: BACK
LOCATION: BACK

## 2022-10-31 ASSESSMENT — PAIN DESCRIPTION - DESCRIPTORS
DESCRIPTORS: ACHING
DESCRIPTORS: ACHING

## 2022-10-31 ASSESSMENT — PAIN - FUNCTIONAL ASSESSMENT: PAIN_FUNCTIONAL_ASSESSMENT: ACTIVITIES ARE NOT PREVENTED

## 2022-10-31 NOTE — PROGRESS NOTES
Daily Progress Note  10/31/2022    Patient Name: Becca Atwood    CHIEF COMPLAINT: Acute psychosis with suicide attempt         SUBJECTIVE:      Patient is seen today for a follow up assessment. Patient has been compliant with scheduled medications at this time and has not required emergency medications in the past 24 hours. When met with on unit patient presents with continued improvement in paranoia. Patient states that \"no one will be following me anymore\". Patient states that he wants to get back with his family. Patient is denying suicidal and homicidal ideation reporting no perceptual disturbances. Patient reports medications have been helpful denying side effects apart from \"a bad taste in my mouth last night\". Patient does present with improvement in obsessions when discussing the book previously mentioned. Patient states that is \"out of my head\". Patient states that he could not find the treasure and endorses the experience of something [place]. Patient reports he is referring to \"Fahad Shauna's [address] in the [address] book\". During conversation patient did not appear distressed and remained overall linear. Appetite:  [x] Adequate/Unchanged  [] Increased  [] Decreased      Sleep:       [x] Adequate/Unchanged  [] Fair  [] Poor      Group Attendance on Unit:   [] Yes   [x] Selectively    [] No    Medication Side Effects: \"a bad taste in my mouth last night\". Mental Status Exam  Level of consciousness: Alert and awake. Appearance: Appropriate attire for setting, seated on bed, with fair  grooming and hygiene. Behavior/Motor: Approachable, compliant with interview  Attitude toward examiner: Cooperative, attentive, good eye contact. Speech: normal rate and normal volume   Mood:  Patient reports \" good\". Affect: Congruent  Thought processes: Linear and coherent. Thought content: Denies homicidal ideation.   Suicidal Ideation: Denies suicidal ideations  Delusions: Patient presents with delusions. Reports improvement in paranoia. Perceptual Disturbance: Patient does not appear to be responding to internal stimuli. Denies auditory hallucinations. Denies visual hallucinations. Cognition: Oriented to person, place, time and situation. Memory: Intact. Insight: Poor. Judgement: Poor. Data   height is 6' (1.829 m) and weight is 206 lb (93.4 kg). His temperature is 97.8 °F (36.6 °C). His blood pressure is 122/78 and his pulse is 84. His respiration is 14. Labs:   No results found for any previous visit. Reviewed patient's current plan of care and vital signs with nursing staff. Labs reviewed: [x] Yes    Medications  Current Facility-Administered Medications: acetaminophen (TYLENOL) tablet 650 mg, 650 mg, Oral, Q4H PRN  aluminum & magnesium hydroxide-simethicone (MAALOX) 200-200-20 MG/5ML suspension 30 mL, 30 mL, Oral, Q6H PRN  hydrOXYzine HCl (ATARAX) tablet 50 mg, 50 mg, Oral, TID PRN  ibuprofen (ADVIL;MOTRIN) tablet 400 mg, 400 mg, Oral, Q6H PRN  nicotine polacrilex (NICORETTE) gum 2 mg, 2 mg, Oral, PRN  polyethylene glycol (GLYCOLAX) packet 17 g, 17 g, Oral, Daily PRN  traZODone (DESYREL) tablet 50 mg, 50 mg, Oral, Nightly PRN  haloperidol lactate (HALDOL) injection 5 mg, 5 mg, IntraMUSCular, Q4H PRN **AND** LORazepam (ATIVAN) injection 2 mg, 2 mg, IntraMUSCular, Q4H PRN **AND** diphenhydrAMINE (BENADRYL) injection 50 mg, 50 mg, IntraMUSCular, Q4H PRN  haloperidol (HALDOL) tablet 5 mg, 5 mg, Oral, Q4H PRN **AND** LORazepam (ATIVAN) tablet 2 mg, 2 mg, Oral, Q4H PRN  paliperidone (INVEGA) extended release tablet 3 mg, 3 mg, Oral, Daily    ASSESSMENT  Acute psychosis ([facility] [address]         HANDOFF  Patient symptoms: Showing improvement  Consultation with attending physician for medication  Encourage participation in groups and milieu.   Probable discharge is to be determined by MD    Electronically signed by [facility] DAWIT Natarajan CNP on 10/31/2022 at 2:50 PM    **This report has been created using voice recognition software. It may contain minor errors which are inherent in voice recognition technology. **  I independently saw and evaluated the patient. I reviewed the  documentation above. Any additional comments or changes to the    documentation are stated below otherwise agree with assessment. The patient continues to be without insight. He is reporting resolution of his paranoia. The patient has been compliant with medications and has maintained behavioral control    He has no side effects from his medications    PLAN  Medications as noted above  Attempt to develop insight  Expected Length of Stay is 1-2 days. Psycho-education conducted. Supportive Therapy conducted.   Follow-up daily while on inpatient unit    Electronically signed by Kam Fontanez MD on 10/31/22 at 7:04 PM EDT

## 2022-10-31 NOTE — GROUP NOTE
Group Therapy Note    Date: 10/31/2022    Group Start Time: 1430  Group End Time: 7611  Group Topic: Cognitive Skills    [facility]    Lidia Kocher, CTRS        Group Therapy Note    Attendees: 7/20       Patient's Goal:  To increase social interaction and to practice expressing feelings, and explore positive factors that improve mental health and sobriety, and communication skills. Also negative factors that harm mental health and sobriety. Notes: Pt attended group and participated fully in group task et discussion. Pt was able to practice expressing feelings, and explore positive factors that improve mental health and sobriety, and communication skills. Also negative factors that harm mental health and sobriety,  through creative expression and discussion. Pt shared individually and was supportive of peers, and able to relate to some of their ideas and feelings,pt was accepting of peers' support in group. Status After Intervention: Improved     Participation Level:  Attentive, sharing, supportive     Participation Quality:  Active listening, sharing, supportive     Speech:  Normal     Thought Process/Content: Logical et linear r/t topic     Affective Functioning: Congruent     Mood: Euthymic     Level of consciousness:  Alert, and Attentive        Response to Learning:  Able to verbalize current knowledge, able to verbalize/acknowledge new learning, able to identify insight, and Progressing to goal        Endings: None Reported     Modes of Intervention: Education, Support, Socialization, Exploration, Clarifying and Problem-solving        Discipline Responsible: Psychoeducational Specialist        Signature:  ALMA Mcgowan

## 2022-10-31 NOTE — PROGRESS NOTES
10/31/22 1527   Encounter Summary   Encounter Overview/Reason  Spiritual/Emotional Needs   Service Provided For: Patient   Referral/Consult From: Nuria   Last Encounter  10/31/22   Complexity of Encounter Moderate   Begin Time 0140   End Time  0215   Total Time Calculated 35 min   Spiritual/Emotional needs   Type Spiritual Support   Behavioral Health    Type  Spirituality Group   Assessment/Intervention/Outcome   Assessment Calm   Intervention Active listening;Nurtured Hope   Outcome Receptive;Engaged in conversation;Expressed Gratitude

## 2022-10-31 NOTE — PLAN OF CARE
[facility]  Day 3 Interdisciplinary Treatment Plan NOTE    Review Date & Time: 10/31/2022 1315    Admission Type:   Admission Type: Involuntary    Reason for admission:  Reason for Admission: delusional, bizarre behavior  Estimated Length of Stay:  5-7 days  Estimated Discharge Date Update:   to be determined by physician    PATIENT STRENGTHS:  Patient Strengths:   Patient Strengths and Limitations:Limitations: Tendency to isolate self, External locus of control, Difficulty problem solving/relies on others to help solve problems, Inappropriate/potentially harmful leisure interests, Jadene Sergeant leisure interests  Addictive Behavior:Addictive Behavior  In the Past 3 Months, Have You Felt or Has Someone Told You That You Have a Problem With  : None  Medical Problems:History reviewed. No pertinent past medical history. Risk:  Fall Risk   Tono Scale Tono Scale Score: 22  BVC    Change in scores:  No Changes to plan of Care:  No    Status EXAM:   Mental Status and Behavioral Exam  Normal: Yes  Level of Assistance: Independent/Self  Facial Expression: Flat  Affect: Appropriate  Level of Consciousness: Alert  Frequency of Checks: 4 times per hour, close  Mood:Normal: No  Mood: Anxious  Motor Activity:Normal: Yes  Eye Contact: Good  Observed Behavior: Friendly, Cooperative  Sexual Misconduct History: Current - no  Involved In Any Sexual Misconduct With Others? : No  History of Sexually Inappropriate Behavior When Previously Hospitalized?: No  Uncontrollable/Compulsive Masturbation?: No  Difficulty Controlling Sexual Impulses?: No  Preception: [place] to person, [place] to time, [place] to place, [place] to situation  Attention:Normal: Yes  Attention: Distractible  Thought Processes: Other (comment) (linear and congruent)  Thought Content:Normal: Yes  Thought Content: Paranoia  Depression Symptoms: No problems reported or observed.   Anxiety Symptoms: Generalized  Poppy Symptoms: No problems reported or observed. Hallucinations: None  Delusions: No  Delusions: Paranoid  Memory:Normal: Yes  Memory: Poor recent, Poor remote  Insight and Judgment: No  Insight and Judgment: Poor judgment, Poor insight    Daily Assessment Last Entry:   Daily Sleep (WDL): Within Defined Limits            Daily Nutrition (WDL): Within Defined Limits  Level of Assistance: Independent/Self    Patient Monitoring:  Frequency of Checks: 4 times per hour, close    Psychiatric Symptoms:   Depression Symptoms  Depression Symptoms: No problems reported or observed. Anxiety Symptoms  Anxiety Symptoms: Generalized  Poppy Symptoms  Poppy Symptoms: No problems reported or observed. Suicide Risk CSSR-S:  1) Within the past month, have you wished you were dead or wished you could go to sleep and not wake up? : Yes  2) Have you actually had any thoughts of killing yourself? : Yes  3) Have you been thinking about how you might kill yourself? : Yes  5) Have you started to work out or worked out the details of how to kill yourself?  Do you intend to carry out this plan? : Yes  6) Have you ever done anything, started to do anything, or prepared to do anything to end your life?: Yes  Change in Result:  none Change in Plan of care:  none      EDUCATION:   Learner Progress Toward Treatment Goals:   Reviewed results and recommendations of this team, Reviewed group plan and strategies, Reviewed signs, symptoms and risk of self harm and violent behavior, Reviewed goals and plan of care    Method:  small group, individual verbal education    Outcome:  Not Verbalized by patient but needs reinforcement to obtain goals    PATIENT GOALS:  Short term:  patient declined to attend  Long term:  patient declined to attend    PLAN/TREATMENT RECOMMENDATIONS UPDATE:  continue with group therapies, increased socialization, continue planning for after discharge goals, continue with medication compliance    SHORT-TERM GOALS UPDATE:   Time frame for Short-Term Goals: 5-7 days    LONG-TERM GOALS UPDATE:   Time frame for Long-Term Goals:  6 months    Members Present in Team Meeting:   See signature sheet    Susan Edward RN

## 2022-10-31 NOTE — PLAN OF CARE
Problem: Psychosis  Goal: Will report no hallucinations or delusions  Description: INTERVENTIONS:  1. Administer medication as  ordered  2. Assist with reality testing to support increasing orientation  3. Assess if patient's hallucinations or delusions are encouraging self harm or harm to others and intervene as appropriate  10/31/2022 1156 by Tomy Reis RN  Outcome: Progressing  Note: Patient has not voiced any notable delusions. Behaviors are controlled. Compliant with medication. Restless has some pacing and mild anxiety. Will continue to monitor and provide support and reassurance. 10/30/2022 2343 by Chris Loco RN  Outcome: Progressing     Problem: Pain  Goal: Verbalizes/displays adequate comfort level or baseline comfort level  10/31/2022 1156 by Tomy Reis RN  Outcome: Progressing  Note: Patient  complains of pain to lower back. Utilizing tylenol for pain. Voiced some effectiveness. Will continue to monitor.   10/30/2022 2343 by Chris Loco RN  Outcome: Progressing

## 2022-10-31 NOTE — PLAN OF CARE
Problem: Psychosis  Goal: Will report no hallucinations or delusions  Description: INTERVENTIONS:  1. Administer medication as  ordered  2. Assist with reality testing to support increasing orientation  3.  Assess if patient's hallucinations or delusions are encouraging self harm or harm to others and intervene as appropriate  10/30/2022 2343 by Florence Gomes RN  Outcome: Progressing     Problem: Pain  Goal: Verbalizes/displays adequate comfort level or baseline comfort level  10/30/2022 2343 by Florence Gomes RN  Outcome: Progressing

## 2022-10-31 NOTE — GROUP NOTE
Group Therapy Note    Date: 10/31/2022    Group Start Time: 1110  Group End Time: 3402  Group Topic: Recreational    [facility]    Maxwell Riddles, CTRS        Group Therapy Note    Attendees: 5       Patient's Goal:  Patient will demonstrate increased interpersonal skills and problem-solving skills. Notes:  Patient was on-time for group. Patient actively participated in [email or web address]. Status After Intervention:  Unchanged    Participation Level:  Active Listener and Interactive    Participation Quality: Appropriate and Attentive      Speech:  normal      Thought Process/Content: Logical  Linear      Affective Functioning: Blunted      Mood: euthymic      Level of consciousness:  Alert, Oriented x4, and Attentive      Response to Learning: Able to verbalize current knowledge/experience and Progressing to goal      Endings: None Reported    Modes of Intervention: Socialization, Problem-solving, and Activity      Discipline Responsible: Psychoeducational Specialist      Signature:  Jimy Geiger

## 2022-11-01 VITALS
RESPIRATION RATE: 20 BRPM | TEMPERATURE: 98.1 F | HEIGHT: 72 IN | DIASTOLIC BLOOD PRESSURE: 89 MMHG | HEART RATE: 107 BPM | SYSTOLIC BLOOD PRESSURE: 133 MMHG | WEIGHT: 206 LBS | BODY MASS INDEX: 27.9 KG/M2

## 2022-11-01 PROCEDURE — 6370000000 HC RX 637 (ALT 250 FOR IP): Performed by: PSYCHIATRY & NEUROLOGY

## 2022-11-01 RX ORDER — PALIPERIDONE 3 MG/1
3 TABLET, EXTENDED RELEASE ORAL DAILY
Qty: 30 TABLET | Refills: 3 | Status: SHIPPED | OUTPATIENT
Start: 2022-11-02

## 2022-11-01 RX ADMIN — ACETAMINOPHEN 650 MG: 325 TABLET, FILM COATED ORAL at 08:42

## 2022-11-01 RX ADMIN — PALIPERIDONE 3 MG: 3 TABLET, EXTENDED RELEASE ORAL at 08:42

## 2022-11-01 ASSESSMENT — PAIN SCALES - GENERAL: PAINLEVEL_OUTOF10: 2

## 2022-11-01 ASSESSMENT — PAIN DESCRIPTION - DESCRIPTORS: DESCRIPTORS: ACHING

## 2022-11-01 ASSESSMENT — PAIN DESCRIPTION - LOCATION: LOCATION: BACK

## 2022-11-01 ASSESSMENT — PAIN - FUNCTIONAL ASSESSMENT: PAIN_FUNCTIONAL_ASSESSMENT: ACTIVITIES ARE NOT PREVENTED

## 2022-11-01 NOTE — PROGRESS NOTES
CLINICAL PHARMACY NOTE: MEDS TO BEDS    Total # of Prescriptions Filled: 1   The following medications were delivered to the patient:  Paliperidone ER 3mg    Additional Documentation:  Delivered medications to nurses station

## 2022-11-01 NOTE — GROUP NOTE
Group Therapy Note    Date: 11/1/2022    Group Start Time: 1000  Group End Time: 9641  Group Topic: Music Therapy    [facility]    Earl Corral        Group Therapy Note    Attendees: 8/20     Patient's Goal:  Patients shared music and based on themes within their song, shared general advice to peer. Patient goals to engage in peer support; Increase sense of community; Increase self-expression; Normalization of the environment    Notes:  Patinet attended and participated in group having positive interactions with peers and staff throughout. Patient shared a song and advice expressing peers they Maryann Rui to feel great\" and if they are having a bad day to go out and seek something to do th change their day    Status After Intervention:  Improved    Participation Level:  Active Listener and Interactive    Participation Quality: Appropriate, Attentive, and Sharing      Speech:  normal      Thought Process/Content: Logical  Linear      Affective Functioning: Congruent      Mood: euthymic      Level of consciousness:  Alert and Attentive      Response to Learning: Able to verbalize current knowledge/experience, Capable of insight, and Progressing to goal      Endings: None Reported    Modes of Intervention: Support, Socialization, Exploration, Activity, Media, and Reality-testing      Discipline Responsible: Psychoeducational Specialist      Signature:  Earl Corral

## 2022-11-01 NOTE — BH NOTE
Patient given tobacco quitline number [phone] at this time, refusing to call at this time, states \" I just dont want to quit now\"- patient given information as to the dangers of long term tobacco use. Continue to reinforce the importance of tobacco cessation.
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
## 2022-11-01 NOTE — DISCHARGE INSTRUCTIONS
Information:  Medications:   Medication summary provided   I understand that I should take only the medications on my list.     -why and when I need to take each medicine.     -which side effects to watch for.     -that I should carry my medication information at all times in case of     Emergency situations. I will take all of my medicines to follow up appointments.     -check with my physician or pharmacist before taking any new    Medication, over the counter product or drink alcohol.    -Ask about food, drug or dietary supplement interactions.    -discard old lists and update records with medication providers. Notify Physician:  Notify physician if you notice:   Always call 911 if you feel your life is in danger  In case of an emergency call 911 immediately! If 911 is not available call your local emergency medical system for help    Behavioral Health Follow Up:  Original Referral Source:[facility] ER  Discharge Diagnosis: Acute psychosis ([facility] [address] [F23]  Recommendations for Level of Care: [facility] mental [facility] outpatient treatment center for follow up appointment and meds  Patient status at discharge: alert, oriented, denies thoughts of harm to self or others, stabilized on meds  My hospital  was: [facility]  Aftercare plan faxed: Donato Diaz   -faxed by: nursing   -date: 11/1/22   -time: 1300  Prescriptions: meds sent home with patient per Meds to Beds    Smoking: Quit Smoking. Call the [employer]'s smoking quitline at 0-826-47F-QUIT  Know the signs of a heart attack   If you have any of the following symptoms call 911 immediately, do not wait more    Than five minutes. 1. Pressure, fullness and/ or squeezing in the center of the chest spreading to    The jaw, neck or shoulder. 2. Chest discomfort with light headedness, fainting, sweating, nausea or    Shortness of breath. 3. Upper abdominal pressure or discomfort.    4. Lower chest pain, back pain, unusual fatigue, shortness of breath, nausea   Or dizziness.      General Information:   Questions regarding your bill: Call HELP program [phone]     Suicide Hotline ([place] [address])  (713) [phone]      Recovery Help line- 111.213.8802      To obtain results of pending studies call Medical Records at: [phone]     For emergencies and 24 hour/7 days a week contact information:  [phone]

## 2022-11-01 NOTE — GROUP NOTE
Group Therapy Note    Date: 11/1/2022    Group Start Time: 1100  Group End Time: 2833  Group Topic: Cognitive Skills    [facility]    Eulalia [place], [place]        Group Therapy Note    Attendees: 6/20       Patient's Goal:  to improve coping skills, improve socialization     Notes:  pt was pleasant and participated well     Status After Intervention:  Improved    Participation Level:  Active Listener and Interactive    Participation Quality: Appropriate and Supportive      Speech:  normal      Thought Process/Content: Logical      Affective Functioning: Congruent      Mood: euthymic      Level of consciousness:  Alert      Response to Learning: Able to verbalize current knowledge/experience and Progressing to goal      Endings: None Reported    Modes of Intervention: Support, Socialization, and Activity      Discipline Responsible: Psychoeducational Specialist      Signature:  Aleida Menjivar

## 2022-11-01 NOTE — PLAN OF CARE
Problem: Psychosis  Goal: Will report no hallucinations or delusions  Description: INTERVENTIONS:  1. Administer medication as  ordered  2. Assist with reality testing to support increasing orientation  3. Assess if patient's hallucinations or delusions are encouraging self harm or harm to others and intervene as appropriate  10/31/2022 2143 by Candis Watkins LPN  Outcome: Progressing  Note: Patient denies any hallucinations at this time. Patient educated and agrees to come to staff with any concerns. Patient continues to be monitored every 15 minutes per environmental safety checks. 10/31/2022 1156 by Paul Contreras RN  Outcome: Progressing  Note: Patient has not voiced any notable delusions. Behaviors are controlled. Compliant with medication. Restless has some pacing and mild anxiety. Will continue to monitor and provide support and reassurance. Problem: Pain  Goal: Verbalizes/displays adequate comfort level or baseline comfort level  10/31/2022 2143 by Candis Watkins LPN  Outcome: Progressing  Note: Patient denies any pain at this time. Patient educated and agrees to come to staff with any concerns. Patient continues to be monitored every 15 minutes per environmental safety checks. 10/31/2022 1156 by Paul Contreras RN  Outcome: Progressing  Note: Patient  complains of pain to lower back. Utilizing tylenol for pain. Voiced some effectiveness. Will continue to monitor.

## 2022-11-01 NOTE — BH NOTE
[facility]  Discharge Note    Pt discharged with followings belongings:   Dental Appliances: None  Vision - Corrective Lenses: Contact Lenses  Hearing Aid: None  Jewelry: None  Body Piercings Removed: N/A  Clothing: Footwear, Pants, Undergarments (pants and underwear were cut)  Other Valuables: Other (Comment) (none)   Valuables sent home with patiemy or returned to patient. Patient educated on aftercare instructions: yes  Information faxed to [facility] by Abena Quiñones  at 1:06 PM .Patient verbalize understanding of AVS:  yes. Status EXAM upon discharge:  Mental Status and Behavioral Exam  Normal: No  Level of Assistance: Independent/Self  Facial Expression: Flat  Affect: Appropriate  Level of Consciousness: Alert  Frequency of Checks: 4 times per hour, close  Mood:Normal: No  Mood: Anxious  Motor Activity:Normal: Yes  Eye Contact: Good  Observed Behavior: Cooperative, Preoccupied  Sexual Misconduct History: Current - no  Involved In Any Sexual Misconduct With Others? : No  History of Sexually Inappropriate Behavior When Previously Hospitalized?: No  Uncontrollable/Compulsive Masturbation?: No  Difficulty Controlling Sexual Impulses?: No  Preception: [place] to person, [place] to time, [place] to place, [place] to situation  Attention:Normal: Yes  Attention: Distractible  Thought Processes: Circumstantial  Thought Content:Normal: No  Thought Content: Preoccupations  Depression Symptoms: No problems reported or observed. Anxiety Symptoms: No problems reported or observed. Poppy Symptoms: No problems reported or observed.   Hallucinations: None  Delusions: No  Delusions: Paranoid  Memory:Normal: No  Memory: Poor remote  Insight and Judgment: No  Insight and Judgment: Poor judgment, Poor insight    Tobacco Screening:  Practical Counseling, on admission, cathy X, if applicable and completed (first 3 are required if patient doesn't refuse):            ( ) Recognizing danger situations (included triggers and roadblocks)                    ( ) Coping skills (new ways to manage stress,relaxation techniques, changing routine, distraction)                                                           ( ) Basic information about quitting (benefits of quitting, techniques in how to quit, available resources  ( ) Referral for counseling faxed to [facility]                                                                                                                   ( ) Patient refused counseling  ( X  ) Patient refused referral  ( ) Patient refused prescription upon discharge  ( ) Patient has not smoked in the last 30 days    Metabolic Screening:    No results found for: LABA1C    No results found for: CHOL  No results found for: TRIG  No results found for: HDL  No components found for: LDLCAL  No results found for: Gerianne Estimable, LPN    Patient discharged to home , picked up by family member and walked out by staff.

## 2024-03-28 NOTE — PLAN OF CARE
"Pt was awake during every nurse rounding. Pt states that he has \"not slept in 4 days\". Melatonin was given in addition to ambien with no help.... He states his insomnia is a chronic problem.  " Problem: Psychosis  Goal: Will report no hallucinations or delusions  Description: INTERVENTIONS:  1. Administer medication as  ordered  2. Assist with reality testing to support increasing orientation  3. Assess if patient's hallucinations or delusions are encouraging self harm or harm to others and intervene as appropriate  10/30/2022 1205 by Mel Garcia RN  Outcome: Progressing     Problem: Pain  Goal: Verbalizes/displays adequate comfort level or baseline comfort level  Outcome: Progressing          Patient denies suicidal ideas at this time. Patient denies homicidal ideas at this time. Patient states depressive symptoms at this time. Patient has been out in day room watching tv and socializing with peers. Patient is free of self harm at this time. Patient agrees to seek out staff if thoughts to harm self arise. Staff will provide support and reassurance as needed. Safety checks maintained every 15 minutes.  Patient states adequate comfort measures; denies pain; struggles to verbalize reality-based thinking: has not accepted acceptance over life and situations beyond his control

## 2025-08-06 ENCOUNTER — OFFICE VISIT (OUTPATIENT)
Age: 65
End: 2025-08-06

## 2025-08-06 VITALS
DIASTOLIC BLOOD PRESSURE: 72 MMHG | TEMPERATURE: 98.3 F | HEIGHT: 72 IN | SYSTOLIC BLOOD PRESSURE: 110 MMHG | BODY MASS INDEX: 26.82 KG/M2 | HEART RATE: 69 BPM | RESPIRATION RATE: 16 BRPM | OXYGEN SATURATION: 92 % | WEIGHT: 198 LBS

## 2025-08-06 DIAGNOSIS — S43.401A SPRAIN OF RIGHT SHOULDER, UNSPECIFIED SHOULDER SPRAIN TYPE, INITIAL ENCOUNTER: ICD-10-CM

## 2025-08-06 DIAGNOSIS — L23.7 POISON IVY DERMATITIS: Primary | ICD-10-CM

## 2025-08-06 PROBLEM — G56.03 BILATERAL CARPAL TUNNEL SYNDROME: Status: ACTIVE | Noted: 2025-08-06

## 2025-08-06 RX ORDER — DEXAMETHASONE SODIUM PHOSPHATE 10 MG/ML
10 INJECTION, SOLUTION INTRA-ARTICULAR; INTRALESIONAL; INTRAMUSCULAR; INTRAVENOUS; SOFT TISSUE ONCE
Status: COMPLETED | OUTPATIENT
Start: 2025-08-06 | End: 2025-08-06

## 2025-08-06 RX ADMIN — DEXAMETHASONE SODIUM PHOSPHATE 10 MG: 10 INJECTION, SOLUTION INTRA-ARTICULAR; INTRALESIONAL; INTRAMUSCULAR; INTRAVENOUS; SOFT TISSUE at 19:53

## 2025-08-07 ASSESSMENT — ENCOUNTER SYMPTOMS
SORE THROAT: 0
COUGH: 0
RESPIRATORY NEGATIVE: 1
EYE PAIN: 0
SHORTNESS OF BREATH: 0
RHINORRHEA: 0